# Patient Record
Sex: MALE | Race: ASIAN | URBAN - METROPOLITAN AREA
[De-identification: names, ages, dates, MRNs, and addresses within clinical notes are randomized per-mention and may not be internally consistent; named-entity substitution may affect disease eponyms.]

---

## 2018-02-15 ENCOUNTER — OFFICE VISIT (OUTPATIENT)
Dept: INTERNAL MEDICINE | Facility: CLINIC | Age: 23
End: 2018-02-15
Payer: COMMERCIAL

## 2018-02-15 VITALS
BODY MASS INDEX: 34.03 KG/M2 | HEIGHT: 70 IN | SYSTOLIC BLOOD PRESSURE: 113 MMHG | HEART RATE: 87 BPM | WEIGHT: 237.7 LBS | DIASTOLIC BLOOD PRESSURE: 78 MMHG

## 2018-02-15 DIAGNOSIS — Z23 NEED FOR PROPHYLACTIC VACCINATION AND INOCULATION AGAINST INFLUENZA: Primary | ICD-10-CM

## 2018-02-15 ASSESSMENT — ENCOUNTER SYMPTOMS
DECREASED CONCENTRATION: 1
DEPRESSION: 0
NERVOUS/ANXIOUS: 1
INSOMNIA: 1
PANIC: 0

## 2018-02-15 ASSESSMENT — PAIN SCALES - GENERAL: PAINLEVEL: NO PAIN (0)

## 2018-02-15 NOTE — PROGRESS NOTES
Rooming Note  Health Maintenance   Health Maintenance Due   Topic Date Due     TETANUS IMMUNIZATION (SYSTEM ASSIGNED)  02/19/2011     INFLUENZA VACCINE (SYSTEM ASSIGNED)  09/01/2017    The following immunizations were discussed, but NOT ordered:   - Tetanus, Diphtheria, Pertusis (Tdap)  The orders were not placed because: patient declined  Pt to get records

## 2018-02-15 NOTE — MR AVS SNAPSHOT
After Visit Summary   2/15/2018    Piper Ortiz    MRN: [id number]           Patient Information     Date Of Birth          1995        Visit Information        Provider Department      2/15/2018 11:00 AM Chris Robertson MD [facility]        Today's Diagnoses     Need for prophylactic vaccination and inoculation against influenza    -  1      Care Instructions    [facility]: [phone]     [facility] Medication Refill Request Information:  * Please contact your pharmacy regarding ANY request for medication refills.  ** [facility] Prescription Fax = [phone]  * Please allow 3 business days for routine medication refills.  * Please allow 5 business days for controlled substance medication refills.     Primary Care Center Test Result notification information:  *You will be notified with in 7-10 days of your appointment day regarding the results of your test.  If you are on MyChart you will be notified as soon as the provider has reviewed the results and signed off on them.            Follow-ups after your visit        Follow-up notes from your care team     Return in about 1 year (around 2/15/2019) for Physical Exam, BP Recheck.      Who to contact     Please call your clinic at [phone] to:    Ask questions about your health    Make or cancel appointments    Discuss your medicines    Learn about your test results    Speak to your doctor            Additional Information About Your Visit        [employer] Information     [employer] gives you secure access to your electronic health record. If you see a primary care provider, you can also send messages to your care team and make appointments. If you have questions, please call your primary care clinic.  If you do not have a primary care provider, please call [phone] and they will assist you.      [employer] is an electronic gateway that provides easy, online access to your medical records. With [employer], you can request a  "clinic appointment, read your test results, renew a prescription or communicate with your care team.     To access your existing account, please contact your [facility] or call [phone] for assistance.        Care EveryWhere ID     This is your Care EveryWhere ID. This could be used by other organizations to access your [place] medical records  [id number]        Your Vitals Were     Pulse Height BMI (Body Mass Index)             87 1.782 m (5' 10.16\") 33.95 kg/m2          Blood Pressure from Last 3 Encounters:   02/15/18 113/78    Weight from Last 3 Encounters:   02/15/18 107.8 kg (237 lb 11.2 oz)              We Performed the Following     FLU VACCINE, 3 YRS +, IM  [[id number]]        Primary Care Provider Office Phone # Fax #    Ronald Perdomo [phone]       [address] [place] [id number]        Equal Access to Services     NAM MARCANO : Hadii aad ku hadasho Soomaali, waaxda luqadaha, qaybta kaalmada adeegyada, waxay fernin hayyamiln kandi dawkins . So [facility] [phone].    ATENCIÓN: Si habla español, tiene a snyder disposición servicios gratuitos de asistencia lingüística. Llame al [phone].    We comply with applicable federal civil rights laws and [place] laws. We do not discriminate on the basis of race, color, national origin, age, disability, sex, sexual orientation, or gender identity.            Thank you!     Thank you for choosing [facility]  for your care. Our goal is always to provide you with excellent care. Hearing back from our patients is one way we can continue to improve our services. Please take a few minutes to complete the written survey that you may receive in the mail after your visit with us. Thank you!             Your Updated Medication List - Protect others around you: Learn how to safely use, store and throw away your medicines at www.disposemymeds.org.      Notice  As of 2/15/2018 " 11:54 AM    You have not been prescribed any medications.

## 2018-02-15 NOTE — NURSING NOTE
Chief Complaint   Patient presents with     Establish Care     pt here to establish care     Physical     pt here for physical     Liliya Rivera CMA at 11:24 AM on 2/15/2018.

## 2018-02-15 NOTE — NURSING NOTE
Injectable Influenza Immunization Documentation      1.  Has the patient received the information for the injectable influenza vaccine? YES    2. Is the patient 6 months of age or older? YES    3. Does the patient have any of the following contraindications?          Severe allergy to eggs? No     Severe allergic reaction to previous influenza vaccines? No     Allergy to contact lens solution/thimerosol? No     History of Guillain-[place] syndrome? No     Undergoing chemotherapy or radiation therapy?       (vaccine should be given at least 2 weeks prior or 3 weeks after)  No     Currently have moderate or severe illness? No         4.  The vaccine has been administered and the patient was instructed to wait 15 minutes before leaving the building in the event of an allergic reaction: YES    Vaccination given by Liliya Rivera CMA at 11:54 AM on 2/15/2018.

## 2018-02-15 NOTE — PATIENT INSTRUCTIONS
[facility]: [phone]     [facility] Medication Refill Request Information:  * Please contact your pharmacy regarding ANY request for medication refills.  ** [facility] Prescription Fax = [phone]  * Please allow 3 business days for routine medication refills.  * Please allow 5 business days for controlled substance medication refills.     [facility] Test Result notification information:  *You will be notified with in 7-10 days of your appointment day regarding the results of your test.  If you are on MyChart you will be notified as soon as the provider has reviewed the results and signed off on them.

## 2018-02-16 ASSESSMENT — ENCOUNTER SYMPTOMS
WEAKNESS: 0
DOUBLE VISION: 0
NECK PAIN: 0
JOINT SWELLING: 0
BLOATING: 0
LIGHT-HEADEDNESS: 0
SEIZURES: 0
DECREASED APPETITE: 0
VOMITING: 0
DEPRESSION: 0
WHEEZING: 0
HYPERTENSION: 0
NUMBNESS: 0
SHORTNESS OF BREATH: 0
NAIL CHANGES: 0
FLANK PAIN: 0
LEG SWELLING: 0
CLAUDICATION: 0
POSTURAL DYSPNEA: 0
TACHYCARDIA: 0
EXERCISE INTOLERANCE: 0
COUGH DISTURBING SLEEP: 0
NERVOUS/ANXIOUS: 1
EYE WATERING: 0
TASTE DISTURBANCE: 0
SLEEP DISTURBANCES DUE TO BREATHING: 0
FATIGUE: 0
DIZZINESS: 0
ABDOMINAL PAIN: 0
LOSS OF CONSCIOUSNESS: 0
RECTAL BLEEDING: 0
MUSCLE WEAKNESS: 0
PARALYSIS: 0
SYNCOPE: 0
INSOMNIA: 1
SPEECH CHANGE: 0
SINUS PAIN: 0
ALTERED TEMPERATURE REGULATION: 0
HYPOTENSION: 0
DYSPNEA ON EXERTION: 0
DISTURBANCES IN COORDINATION: 0
INCREASED ENERGY: 0
DECREASED CONCENTRATION: 1
HALLUCINATIONS: 0
EYE REDNESS: 0
ORTHOPNEA: 0
RECTAL PAIN: 0
SKIN CHANGES: 0
EXTREMITY NUMBNESS: 0
SINUS CONGESTION: 0
COUGH: 0
SNORES LOUDLY: 0
NIGHT SWEATS: 0
DYSURIA: 0
CHILLS: 0
HEMOPTYSIS: 0
BRUISES/BLEEDS EASILY: 0
DIFFICULTY URINATING: 0
EYE PAIN: 0
STIFFNESS: 0
SMELL DISTURBANCE: 0
SORE THROAT: 0
RESPIRATORY PAIN: 0
TROUBLE SWALLOWING: 0
HEARTBURN: 0
HEADACHES: 0
LEG PAIN: 0
WEIGHT LOSS: 0
PALPITATIONS: 0
SWOLLEN GLANDS: 0
ARTHRALGIAS: 0
SPUTUM PRODUCTION: 0
NAUSEA: 0
TREMORS: 0
BLOOD IN STOOL: 0
HOARSE VOICE: 0
MUSCLE CRAMPS: 0
TINGLING: 0
MYALGIAS: 0
CONSTIPATION: 0
EYE IRRITATION: 0
FEVER: 0
HEMATURIA: 0
POOR WOUND HEALING: 0
PANIC: 0
POLYPHAGIA: 0
BACK PAIN: 0
BOWEL INCONTINENCE: 0
NECK MASS: 0
WEIGHT GAIN: 0
JAUNDICE: 0
DIARRHEA: 0
POLYDIPSIA: 0
MEMORY LOSS: 0

## 2018-02-16 NOTE — PROGRESS NOTES
[facility] Office Visit  Date: February 16, 2018  Resident: Chris Henderson MD  Attending: Dr. Vaca    SUBJECTIVE:  Piper Ortiz is a 22 year old male with no PMHx comes in today to establish care.  He would like influenza vaccine.  Also discussed weight loss strategies.  Patient is in overall good health.  Has no recurrent symptoms.  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  He states that all other vaccinations are up-to-date and was recently screened in July 2017 for diabetes which was negative.  Patient does not smoke cigarettes, drink alcohol, uses illicit drugs.  He is currently not sexually active and does not have history of STDs.    PMHx:  None    Allergies: None    Medications: None    Surgical history: None    Family history:  DM2 - mother and father    Social history:  Does not smoke, no drinking, no illicit drug use, no STD hx    Review of Systems     Constitutional:  Negative for fever, chills, weight loss, weight gain, fatigue, decreased appetite, night sweats, recent stressors, height gain, height loss, post-operative complications, incisional pain, hallucinations, increased energy, hyperactivity and confused.   HENT:  Negative for ear pain, hearing loss, tinnitus, nosebleeds, trouble swallowing, hoarse voice, mouth sores, sore throat, ear discharge, tooth pain, gum tenderness, taste disturbance, smell disturbance, hearing aid, bleeding gums, dry mouth, sinus pain, sinus congestion and neck mass.    Eyes:  Negative for double vision, pain, redness, eye pain, decreased vision, eye watering, eye bulging, eye dryness, flashing lights, spots, floaters, strabismus, tunnel vision, jaundice and eye irritation.   Respiratory:   Negative for cough, hemoptysis, sputum production, shortness of breath, wheezing, sleep disturbances due to breathing, snores loudly, respiratory pain, dyspnea on exertion, cough disturbing sleep and postural  dyspnea.    Cardiovascular:  Negative for chest pain, dyspnea on exertion, palpitations, orthopnea, claudication, leg swelling, fingers/toes turn blue, hypertension, hypotension, syncope, history of heart murmur, chest pain on exertion, chest pain at rest, pacemaker, few scattered varicosities, leg pain, sleep disturbances due to breathing, tachycardia, light-headedness, exercise intolerance and edema.   Gastrointestinal:  Negative for heartburn, nausea, vomiting, abdominal pain, diarrhea, constipation, blood in stool, melena, rectal pain, bloating, hemorrhoids, bowel incontinence, jaundice, rectal bleeding, coffee ground emesis and change in stool.   Genitourinary:  Negative for bladder incontinence, dysuria, urgency, hematuria, flank pain, difficulty urinating, nocturia, voiding less frequently, scrotal pain, ulcerations, penile discharge, male genitourinary complaint and reduced libido.   Musculoskeletal:  Negative for myalgias, back pain, joint swelling, arthralgias, stiffness, muscle cramps, neck pain, bone pain, muscle weakness and fracture.   Skin:  Negative for nail changes, itching, poor wound healing, rash, hair changes, skin changes, acne, warts, poor wound healing, scarring, flaky skin, Raynaud's phenomenon, sensitivity to sunlight and skin thickening.   Neurological:  Negative for dizziness, tingling, tremors, speech change, seizures, loss of consciousness, weakness, light-headedness, numbness, headaches, disturbances in coordination, extremity numbness, memory loss, difficulty walking and paralysis.   Endo/Heme:  Negative for anemia, swollen glands and bruises/bleeds easily.   Psychiatric/Behavioral:  Positive for decreased concentration. Negative for depression, hallucinations, memory loss, mood swings and panic attacks.    Endocrine:  Negative for altered temperature regulation, polyphagia, polydipsia, unwanted hair growth and change in facial hair.        OBJECTIVE:  /78  Pulse 87  Ht 1.782  "m (5' 10.16\")  Wt 107.8 kg (237 lb 11.2 oz)  BMI 33.95 kg/m2  Body mass index is 33.95 kg/(m^2).   Gen: Well-developed, well-nourished and in no apparent distress  HEENT:  Normocephalic, atraumatic, PERRL, no scleral icterus, TM  visualized bilaterally without effusion/erythema, external auditory canals normal, no nasal discharge,  Nasal turbinates normal, OP clear.  Cranial nerves grossly intact.  Neck: supple, no LAD, no thyromegaly  CV: regular rate and rhythm, normal S1 S2 and no murmur, click, or rub  Resp: clear to ausculation bilaterally, normal respiratory effort  Abd: bowel sounds +, soft NT/ND,  no masses or hepatosplenomegaly  Ext: WWP.  no edema.    Skin: warm and dry  Psych: normal mood/affect, appropriately oriented  Neuro: AAOX3, cooperative, cranial nerves II-XII intact    ASSESSMENT/PLAN:  Pt is a 22 year old male here to establish care    Jyot was seen today for establish care and physical.    Diagnoses and all orders for this visit:    Need for prophylactic vaccination and inoculation against influenza  -     FLU VACCINE, 3 YRS +, IM  [[id number]]      Weightloss  Overweight  Discussed weight loss strategies such as counting calories, balanced diet and exercise habits.     Return to clinic in 12 month(s)    This patient was seen and staffed with my attending, Dr. Vaca, who agrees with my assessment and plan.     Chris Robertson MD PhD  Internal Medicine, PGY-2  Pager [phone]    Attestation:  I, Luna Solomon, saw this patient with the resident and agree with the resident s findings and plan of care as documented in the resident s note.      Luna Solomon MD                         "

## 2019-06-10 ENCOUNTER — MEDICAL CORRESPONDENCE (OUTPATIENT)
Dept: HEALTH INFORMATION MANAGEMENT | Facility: CLINIC | Age: 24
End: 2019-06-10

## 2019-06-10 ENCOUNTER — TELEPHONE (OUTPATIENT)
Dept: DERMATOLOGY | Facility: CLINIC | Age: 24
End: 2019-06-10
Payer: COMMERCIAL

## 2019-06-10 ENCOUNTER — TRANSFERRED RECORDS (OUTPATIENT)
Dept: HEALTH INFORMATION MANAGEMENT | Facility: CLINIC | Age: 24
End: 2019-06-10

## 2019-06-10 NOTE — TELEPHONE ENCOUNTER
[facility] Call Center    Phone Message    May a detailed message be left on voicemail: yes    Reason for Call: Other: Pt is being referral by Alyse Jung at [facility] for a single skin nodlue, sebaceaus cyst/rapidly growing and changing in the last 2 month. Please call pt to discuss getting him in within the 2 week timeframe indicated in the guidelines. Thanks!     Action Taken: Message routed to:  [facility] ([facility] Dermatology

## 2019-06-24 ENCOUNTER — OFFICE VISIT (OUTPATIENT)
Dept: DERMATOLOGY | Facility: CLINIC | Age: 24
End: 2019-06-24
Payer: COMMERCIAL

## 2019-06-24 DIAGNOSIS — D48.5 NEOPLASM OF UNCERTAIN BEHAVIOR OF SKIN: ICD-10-CM

## 2019-06-24 DIAGNOSIS — L72.0 KERATIN CYST: Primary | ICD-10-CM

## 2019-06-24 ASSESSMENT — PAIN SCALES - GENERAL: PAINLEVEL: NO PAIN (0)

## 2019-06-24 NOTE — NURSING NOTE
Dermatology Rooming Note    Piper Ortiz's goals for this visit include:   Chief Complaint   Patient presents with     Derm Problem     Piper is here today for a possible cyst above his nose.      KENAN Love

## 2019-06-24 NOTE — PROGRESS NOTES
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  It is brown, globose and contains  a yellow-white component.      ASSESSMENT:  Likely a keratinaceous cyst with a broad base.      PLAN:  Advised that we could remove this for him through biopsy and have it analyzed and eradicated in that fashion.  Advised there could be a small scar from such removal but we would not excise any skin and I believe that removing the cyst would probably result in a reepithelialization and give a very satisfactory result.  I do note on exam of his face he does have several depressed ovoid scars on the cheeks.  This may have been due to varicella in the past, but I am not sure.  Very pleasant individual.      ASSESSMENT:  Probable keratinaceous cyst, face.        PLAN:  The lesion was photographed.  The patient signed a consent slip that he wished to have this removed.  I then went ahead and anesthetized the area with lidocaine with epinephrine.  I then shave removed the lesion.  I actually cut through the lesion and there was a small amount left which I then gently curetted out.  I did not remove any dermis or epithelium other than over the top of the cyst.  The wound was treated with aluminum chloride hemostatic agent and then Vaseline and a light dressing was applied.  Aftercare was discussed.  Return if he has any difficulties with that.  Meds and allergies reviewed.

## 2019-06-24 NOTE — LETTER
6/24/2019       RE: Piper Ortiz  [address]  [address]  [facility] [place] [address]     Dear Colleague,    Thank you for referring your patient, Piper Ortiz, to the [facility] DERMATOLOGY at [facility]. Please see a copy of my visit note below.             Dictation on: 06/24/2019  3:56 PM by: SHANTELL MENDEZ [[id number]]         Again, thank you for allowing me to participate in the care of your patient.      Sincerely,    SHANTELL Mendez MD

## 2019-06-24 NOTE — PATIENT INSTRUCTIONS
The whitish growth that we removed today appears to be a keratin cyst. We will send it to the pathology lab for confirmation. We will call you with the result.      You may note a small depression where the cyst had been.  This should fill in over the next month or so.     You may clean the site with soap and water daily then apply a small amount of vasoline and a bandaid.       Wound Care After a Biopsy    What is a skin biopsy?  A skin biopsy allows the doctor to examine a very small piece of tissue under the microscope to determine the diagnosis and the best treatment for the skin condition. A local anesthetic (numbing medicine)  is injected with a very small needle into the skin area to be tested. A small piece of skin is taken from the area. Sometimes a suture (stitch) is used.     What are the risks of a skin biopsy?  I will experience scar, bleeding, swelling, pain, crusting and redness. I may experience incomplete removal or recurrence. Risks of this procedure are excessive bleeding, bruising, infection, nerve damage, numbness, thick (hypertrophic or keloidal) scar and non-diagnostic biopsy.    How should I care for my wound for the first 24 hours?    Keep the wound dry and covered for 24 hours    If it bleeds, hold direct pressure on the area for 15 minutes. If bleeding does not stop then go to the emergency room    Avoid strenuous exercise the first 1-2 days or as your doctor instructs you    How should I care for the wound after 24 hours?    After 24 hours, remove the bandage    You may bathe or shower as normal    If you had a scalp biopsy, you can shampoo as usual and can use shower water to clean the biopsy site daily    Clean the wound twice a day with gentle soap and water    Do not scrub, be gentle    Apply white petroleum/Vaseline after cleaning the wound with a cotton swab or a clean finger, and keep the site covered with a Bandaid /bandage. Bandages are not necessary with a scalp biopsy    If you  are unable to cover the site with a Bandaid /bandage, re-apply ointment 2-3 times a day to keep the site moist. Moisture will help with healing    Avoid strenuous activity for first 1-2 days    Avoid lakes, rivers, pools, and oceans until the stitches are removed or the site is healed    How do I clean my wound?    Wash hands thoroughly with soap or use hand  before all wound care    Clean the wound with gentle soap and water    Apply white petroleum/Vaseline  to wound after it is clean    Replace the Bandaid /bandage to keep the wound covered for the first few days or as instructed by your doctor    If you had a scalp biopsy, warm shower water to the area on a daily basis should suffice    What should I use to clean my wound?     Cotton-tipped applicators (Qtips )    White petroleum jelly (Vaseline ). Use a clean new container and use Q-tips to apply.    Bandaids   as needed    Gentle soap     How should I care for my wound long term?    Do not get your wound dirty    Keep up with wound care for one week or until the area is healed.    A small scab will form and fall off by itself when the area is completely healed. The area will be red and will become pink in color as it heals. Sun protection is very important for how your scar will turn out. Sunscreen with an SPF 30 or greater is recommended once the area is healed.    If you have stitches, stitches need to be removed in 14 days. You may return to our clinic for this or you may have it done locally at your doctor s office.    You should have some soreness but it should be mild and slowly go away over several days. Talk to your doctor about using tylenol for pain,    When should I call my doctor?  If you have increased:     Pain or swelling    Pus or drainage (clear or slightly yellow drainage is ok)    Temperature over 100F    Spreading redness or warmth around wound    When will I hear about my results?  The biopsy results can take 2-3 weeks to come  back. The clinic will call you with the results, send you a mychart message, or have you schedule a follow-up clinic or phone time to discuss the results. Contact our clinics if you do not hear from us in 3 weeks.     Who should I call with questions?    [facility]: [phone]     [facility]: [phone]    For urgent needs outside of business hours call the [facility] at [phone] and ask for the dermatology resident on call

## 2019-06-24 NOTE — LETTER
6/24/2019      RE: Piper Ortiz  [address]  [address]  [place] [facility] [address]       Dear Colleague,    Thank you for the opportunity to participate in the care of your patient, Piper Ortiz, at the [facility] DERMATOLOGY at [facility]. Please see a copy of my visit note below.            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  It is brown, globose and contains  a yellow-white component.      ASSESSMENT:  Likely a keratinaceous cyst with a broad base.      PLAN:  Advised that we could remove this for him through biopsy and have it analyzed and eradicated in that fashion.  Advised there could be a small scar from such removal but we would not excise any skin and I believe that removing the cyst would probably result in a reepithelialization and give a very satisfactory result.  I do note on exam of his face he does have several depressed ovoid scars on the cheeks.  This may have been due to varicella in the past, but I am not sure.  Very pleasant individual.      ASSESSMENT:  Probable keratinaceous cyst, face.        PLAN:  The lesion was photographed.  The patient signed a consent slip that he wished to have this removed.  I then went ahead and anesthetized the area with lidocaine with epinephrine.  I then shave removed the lesion.  I actually cut through the lesion and there was a small amount left which I then gently curetted out.  I did not remove any dermis or epithelium other than over the top of the cyst.  The wound was treated with aluminum chloride hemostatic agent and then Vaseline and a light dressing was applied.  Aftercare was discussed.  Return if he has any difficulties with that.  Meds and allergies reviewed.           Please do not hesitate to contact me if you have any  questions/concerns.     Sincerely,     SHANTELL Walker MD

## 2019-06-25 ASSESSMENT — PAIN SCALES - GENERAL: PAINLEVEL: MILD PAIN (2)

## 2019-06-26 LAB — COPATH REPORT: NORMAL

## 2020-03-11 ENCOUNTER — HEALTH MAINTENANCE LETTER (OUTPATIENT)
Age: 25
End: 2020-03-11

## 2021-01-03 ENCOUNTER — HEALTH MAINTENANCE LETTER (OUTPATIENT)
Age: 26
End: 2021-01-03

## 2021-04-25 ENCOUNTER — HEALTH MAINTENANCE LETTER (OUTPATIENT)
Age: 26
End: 2021-04-25

## 2021-10-10 ENCOUNTER — HEALTH MAINTENANCE LETTER (OUTPATIENT)
Age: 26
End: 2021-10-10

## 2022-05-21 ENCOUNTER — HEALTH MAINTENANCE LETTER (OUTPATIENT)
Age: 27
End: 2022-05-21

## 2022-09-17 ENCOUNTER — HEALTH MAINTENANCE LETTER (OUTPATIENT)
Age: 27
End: 2022-09-17

## 2023-06-04 ENCOUNTER — HEALTH MAINTENANCE LETTER (OUTPATIENT)
Age: 28
End: 2023-06-04